# Patient Record
Sex: MALE | Race: BLACK OR AFRICAN AMERICAN | NOT HISPANIC OR LATINO | Employment: FULL TIME | URBAN - METROPOLITAN AREA
[De-identification: names, ages, dates, MRNs, and addresses within clinical notes are randomized per-mention and may not be internally consistent; named-entity substitution may affect disease eponyms.]

---

## 2023-03-01 LAB
ALANINE AMINOTRANSFERASE (SGPT) (U/L) IN SER/PLAS: 33 U/L (ref 10–52)
ALBUMIN (G/DL) IN SER/PLAS: 4.8 G/DL (ref 3.4–5)
ALKALINE PHOSPHATASE (U/L) IN SER/PLAS: 82 U/L (ref 33–120)
ANION GAP IN SER/PLAS: 12 MMOL/L (ref 10–20)
ASPARTATE AMINOTRANSFERASE (SGOT) (U/L) IN SER/PLAS: 24 U/L (ref 9–39)
BASOPHILS (10*3/UL) IN BLOOD BY AUTOMATED COUNT: 0.04 X10E9/L (ref 0–0.1)
BASOPHILS/100 LEUKOCYTES IN BLOOD BY AUTOMATED COUNT: 0.5 % (ref 0–2)
BILIRUBIN DIRECT (MG/DL) IN SER/PLAS: 0.1 MG/DL (ref 0–0.3)
BILIRUBIN TOTAL (MG/DL) IN SER/PLAS: 0.7 MG/DL (ref 0–1.2)
CALCIDIOL (25 OH VITAMIN D3) (NG/ML) IN SER/PLAS: 16 NG/ML
CALCIUM (MG/DL) IN SER/PLAS: 10.4 MG/DL (ref 8.6–10.6)
CARBON DIOXIDE, TOTAL (MMOL/L) IN SER/PLAS: 31 MMOL/L (ref 21–32)
CHLORIDE (MMOL/L) IN SER/PLAS: 101 MMOL/L (ref 98–107)
CHOLESTEROL (MG/DL) IN SER/PLAS: 241 MG/DL (ref 0–199)
CHOLESTEROL IN HDL (MG/DL) IN SER/PLAS: 56 MG/DL
CHOLESTEROL/HDL RATIO: 4.3
CREATININE (MG/DL) IN SER/PLAS: 1.11 MG/DL (ref 0.5–1.3)
EOSINOPHILS (10*3/UL) IN BLOOD BY AUTOMATED COUNT: 0.16 X10E9/L (ref 0–0.7)
EOSINOPHILS/100 LEUKOCYTES IN BLOOD BY AUTOMATED COUNT: 1.9 % (ref 0–6)
ERYTHROCYTE DISTRIBUTION WIDTH (RATIO) BY AUTOMATED COUNT: 12.1 % (ref 11.5–14.5)
ERYTHROCYTE MEAN CORPUSCULAR HEMOGLOBIN CONCENTRATION (G/DL) BY AUTOMATED: 33.1 G/DL (ref 32–36)
ERYTHROCYTE MEAN CORPUSCULAR VOLUME (FL) BY AUTOMATED COUNT: 93 FL (ref 80–100)
ERYTHROCYTES (10*6/UL) IN BLOOD BY AUTOMATED COUNT: 5.14 X10E12/L (ref 4.5–5.9)
GFR MALE: 88 ML/MIN/1.73M2
GLUCOSE (MG/DL) IN SER/PLAS: 83 MG/DL (ref 74–99)
HEMATOCRIT (%) IN BLOOD BY AUTOMATED COUNT: 48 % (ref 41–52)
HEMOGLOBIN (G/DL) IN BLOOD: 15.9 G/DL (ref 13.5–17.5)
IMMATURE GRANULOCYTES/100 LEUKOCYTES IN BLOOD BY AUTOMATED COUNT: 0.2 % (ref 0–0.9)
LDL: 157 MG/DL (ref 0–99)
LEUKOCYTES (10*3/UL) IN BLOOD BY AUTOMATED COUNT: 8.3 X10E9/L (ref 4.4–11.3)
LYMPHOCYTES (10*3/UL) IN BLOOD BY AUTOMATED COUNT: 2.77 X10E9/L (ref 1.2–4.8)
LYMPHOCYTES/100 LEUKOCYTES IN BLOOD BY AUTOMATED COUNT: 33.4 % (ref 13–44)
MONOCYTES (10*3/UL) IN BLOOD BY AUTOMATED COUNT: 1.03 X10E9/L (ref 0.1–1)
MONOCYTES/100 LEUKOCYTES IN BLOOD BY AUTOMATED COUNT: 12.4 % (ref 2–10)
NEUTROPHILS (10*3/UL) IN BLOOD BY AUTOMATED COUNT: 4.28 X10E9/L (ref 1.2–7.7)
NEUTROPHILS/100 LEUKOCYTES IN BLOOD BY AUTOMATED COUNT: 51.6 % (ref 40–80)
NRBC (PER 100 WBCS) BY AUTOMATED COUNT: 0 /100 WBC (ref 0–0)
PLATELETS (10*3/UL) IN BLOOD AUTOMATED COUNT: 298 X10E9/L (ref 150–450)
POTASSIUM (MMOL/L) IN SER/PLAS: 4.4 MMOL/L (ref 3.5–5.3)
PROTEIN TOTAL: 7.3 G/DL (ref 6.4–8.2)
SODIUM (MMOL/L) IN SER/PLAS: 140 MMOL/L (ref 136–145)
TRIGLYCERIDE (MG/DL) IN SER/PLAS: 139 MG/DL (ref 0–149)
UREA NITROGEN (MG/DL) IN SER/PLAS: 12 MG/DL (ref 6–23)
VLDL: 28 MG/DL (ref 0–40)

## 2023-03-02 LAB — THYROTROPIN (MIU/L) IN SER/PLAS BY DETECTION LIMIT <= 0.05 MIU/L: 2.46 MIU/L (ref 0.44–3.98)

## 2023-03-08 DIAGNOSIS — E03.9 HYPOTHYROIDISM, UNSPECIFIED TYPE: Primary | ICD-10-CM

## 2023-03-11 DIAGNOSIS — M62.830 MUSCLE SPASM OF BACK: ICD-10-CM

## 2023-03-14 DIAGNOSIS — K04.7 PERIAPICAL ABSCESS WITHOUT SINUS: ICD-10-CM

## 2023-03-15 RX ORDER — LEVOTHYROXINE SODIUM 25 UG/1
TABLET ORAL
Qty: 90 TABLET | Refills: 3 | Status: SHIPPED | OUTPATIENT
Start: 2023-03-15 | End: 2023-08-22 | Stop reason: ALTCHOICE

## 2023-03-15 RX ORDER — CYCLOBENZAPRINE HCL 10 MG
TABLET ORAL
Qty: 30 TABLET | Refills: 5 | Status: SHIPPED | OUTPATIENT
Start: 2023-03-15 | End: 2023-09-19

## 2023-03-27 RX ORDER — IBUPROFEN 800 MG/1
TABLET ORAL
Qty: 21 TABLET | OUTPATIENT
Start: 2023-03-27

## 2023-05-27 DIAGNOSIS — I10 ESSENTIAL (PRIMARY) HYPERTENSION: ICD-10-CM

## 2023-05-30 RX ORDER — AMLODIPINE BESYLATE 10 MG/1
TABLET ORAL
Qty: 90 TABLET | Refills: 0 | Status: SHIPPED | OUTPATIENT
Start: 2023-05-30 | End: 2023-08-22 | Stop reason: SDUPTHER

## 2023-08-21 PROBLEM — G47.00 INSOMNIA: Status: ACTIVE | Noted: 2023-08-21

## 2023-08-21 PROBLEM — U07.1 COVID-19 VIRUS INFECTION: Status: ACTIVE | Noted: 2023-08-21

## 2023-08-21 PROBLEM — F41.9 ANXIETY: Status: ACTIVE | Noted: 2023-08-21

## 2023-08-21 PROBLEM — E03.9 HYPOTHYROIDISM: Status: ACTIVE | Noted: 2023-08-21

## 2023-08-21 PROBLEM — K59.09 CHRONIC CONSTIPATION: Status: ACTIVE | Noted: 2023-08-21

## 2023-08-21 PROBLEM — R53.83 FATIGUE: Status: ACTIVE | Noted: 2023-08-21

## 2023-08-21 PROBLEM — R19.7 INTERMITTENT DIARRHEA: Status: ACTIVE | Noted: 2023-08-21

## 2023-08-21 PROBLEM — R93.2 ABNORMAL LIVER SCAN: Status: ACTIVE | Noted: 2023-08-21

## 2023-08-21 PROBLEM — E55.9 VITAMIN D DEFICIENCY: Status: ACTIVE | Noted: 2023-08-21

## 2023-08-21 PROBLEM — J30.9 ALLERGIC RHINITIS: Status: ACTIVE | Noted: 2023-08-21

## 2023-08-21 PROBLEM — J30.2 SEASONAL ALLERGIES: Status: ACTIVE | Noted: 2023-08-21

## 2023-08-21 PROBLEM — M62.830 BACK SPASM: Status: ACTIVE | Noted: 2023-08-21

## 2023-08-21 PROBLEM — I10 HYPERTENSION: Status: ACTIVE | Noted: 2023-08-21

## 2023-08-21 PROBLEM — I77.1 TORTUOUS AORTA (CMS-HCC): Status: ACTIVE | Noted: 2023-08-21

## 2023-08-21 PROBLEM — L30.9 ECZEMA: Status: ACTIVE | Noted: 2023-08-21

## 2023-08-21 PROBLEM — L01.02 PUSTULAR FOLLICULITIS: Status: ACTIVE | Noted: 2023-08-21

## 2023-08-21 RX ORDER — AMOXICILLIN AND CLAVULANATE POTASSIUM 875; 125 MG/1; MG/1
1 TABLET, FILM COATED ORAL 2 TIMES DAILY
COMMUNITY
Start: 2023-02-06 | End: 2023-08-22 | Stop reason: ALTCHOICE

## 2023-08-21 RX ORDER — MUPIROCIN 20 MG/G
OINTMENT TOPICAL 3 TIMES DAILY
COMMUNITY
Start: 2022-08-02 | End: 2024-01-16 | Stop reason: ALTCHOICE

## 2023-08-21 RX ORDER — ESCITALOPRAM OXALATE 20 MG/1
TABLET ORAL
COMMUNITY
Start: 2023-05-27 | End: 2023-08-22 | Stop reason: ALTCHOICE

## 2023-08-21 RX ORDER — PENICILLIN V POTASSIUM 500 MG/1
TABLET, FILM COATED ORAL
COMMUNITY
Start: 2023-05-27 | End: 2023-08-22 | Stop reason: ALTCHOICE

## 2023-08-21 RX ORDER — MONTELUKAST SODIUM 10 MG/1
10 TABLET ORAL DAILY
COMMUNITY
End: 2023-08-22 | Stop reason: ALTCHOICE

## 2023-08-21 RX ORDER — IBUPROFEN 800 MG/1
800 TABLET ORAL 3 TIMES DAILY PRN
COMMUNITY
End: 2024-01-16 | Stop reason: ALTCHOICE

## 2023-08-22 ENCOUNTER — OFFICE VISIT (OUTPATIENT)
Dept: PRIMARY CARE | Facility: CLINIC | Age: 38
End: 2023-08-22
Payer: COMMERCIAL

## 2023-08-22 VITALS
BODY MASS INDEX: 36.37 KG/M2 | OXYGEN SATURATION: 97 % | DIASTOLIC BLOOD PRESSURE: 73 MMHG | RESPIRATION RATE: 18 BRPM | WEIGHT: 240 LBS | HEART RATE: 82 BPM | HEIGHT: 68 IN | SYSTOLIC BLOOD PRESSURE: 119 MMHG | TEMPERATURE: 98.6 F

## 2023-08-22 DIAGNOSIS — G47.00 INSOMNIA, UNSPECIFIED TYPE: ICD-10-CM

## 2023-08-22 DIAGNOSIS — M62.830 BACK SPASM: ICD-10-CM

## 2023-08-22 DIAGNOSIS — I10 ESSENTIAL (PRIMARY) HYPERTENSION: ICD-10-CM

## 2023-08-22 DIAGNOSIS — R53.83 FATIGUE, UNSPECIFIED TYPE: ICD-10-CM

## 2023-08-22 DIAGNOSIS — J30.1 NON-SEASONAL ALLERGIC RHINITIS DUE TO POLLEN: ICD-10-CM

## 2023-08-22 DIAGNOSIS — E55.9 VITAMIN D DEFICIENCY: ICD-10-CM

## 2023-08-22 DIAGNOSIS — I10 HYPERTENSION, UNSPECIFIED TYPE: Primary | ICD-10-CM

## 2023-08-22 DIAGNOSIS — K76.0 FATTY LIVER: ICD-10-CM

## 2023-08-22 DIAGNOSIS — J30.2 SEASONAL ALLERGIES: ICD-10-CM

## 2023-08-22 DIAGNOSIS — F41.9 ANXIETY: ICD-10-CM

## 2023-08-22 DIAGNOSIS — K58.2 IRRITABLE BOWEL SYNDROME WITH BOTH CONSTIPATION AND DIARRHEA: ICD-10-CM

## 2023-08-22 DIAGNOSIS — F41.9 ANXIETY: Primary | ICD-10-CM

## 2023-08-22 PROBLEM — R93.2 ABNORMAL LIVER SCAN: Status: RESOLVED | Noted: 2023-08-21 | Resolved: 2023-08-22

## 2023-08-22 PROBLEM — U07.1 COVID-19 VIRUS INFECTION: Status: RESOLVED | Noted: 2023-08-21 | Resolved: 2023-08-22

## 2023-08-22 PROBLEM — R19.7 INTERMITTENT DIARRHEA: Status: RESOLVED | Noted: 2023-08-21 | Resolved: 2023-08-22

## 2023-08-22 PROBLEM — E03.9 HYPOTHYROIDISM: Status: RESOLVED | Noted: 2023-08-21 | Resolved: 2023-08-22

## 2023-08-22 PROBLEM — K59.09 CHRONIC CONSTIPATION: Status: RESOLVED | Noted: 2023-08-21 | Resolved: 2023-08-22

## 2023-08-22 PROCEDURE — 1036F TOBACCO NON-USER: CPT | Performed by: PEDIATRICS

## 2023-08-22 PROCEDURE — 99214 OFFICE O/P EST MOD 30 MIN: CPT | Performed by: PEDIATRICS

## 2023-08-22 PROCEDURE — 3078F DIAST BP <80 MM HG: CPT | Performed by: PEDIATRICS

## 2023-08-22 PROCEDURE — 3074F SYST BP LT 130 MM HG: CPT | Performed by: PEDIATRICS

## 2023-08-22 RX ORDER — ESCITALOPRAM OXALATE 5 MG/1
TABLET ORAL
Qty: 30 TABLET | Refills: 1 | Status: SHIPPED | OUTPATIENT
Start: 2023-08-22 | End: 2023-09-15

## 2023-08-22 RX ORDER — AMLODIPINE BESYLATE 10 MG/1
10 TABLET ORAL DAILY
Qty: 90 TABLET | Refills: 0 | Status: SHIPPED | OUTPATIENT
Start: 2023-08-22 | End: 2023-10-17 | Stop reason: SDUPTHER

## 2023-08-22 RX ORDER — BUSPIRONE HYDROCHLORIDE 15 MG/1
TABLET ORAL
Qty: 30 TABLET | Refills: 3 | Status: SHIPPED | OUTPATIENT
Start: 2023-08-22 | End: 2023-09-15

## 2023-08-22 RX ORDER — ESCITALOPRAM OXALATE 20 MG/1
20 TABLET ORAL DAILY
Qty: 30 TABLET | Refills: 5 | Status: SHIPPED | OUTPATIENT
Start: 2023-08-22 | End: 2023-08-22 | Stop reason: SINTOL

## 2023-08-22 ASSESSMENT — PAIN SCALES - GENERAL: PAINLEVEL: 0-NO PAIN

## 2023-08-22 NOTE — PATIENT INSTRUCTIONS
Tylenol PM at night  Ease up Lexapro to day; cut in half so you are taking 10 mg for a month; then 5 mg for a month and then 5 mg every other day for 2 weeks.  Start Buspirone at half tab twice a day for first week and then one twice a day  Avoid creamy dressings and chips  See counselor  Take vitamin D 1000 units a day  Read Anxiety Cure by dr Graciela Rodriguez  Follow up in 2 months

## 2023-08-22 NOTE — PROGRESS NOTES
"Subjective   Patient ID: Eric Saha is a 38 y.o. male who presents for Hypothyroidism and Hypertension.    HPI   Patient wants to get off of Lexapro because it did not work; feels vertigo when he does not take the Lexapro; explained that he has to taper off;  Bothered by insomnia  Eats salads mostly but with creamy dressing; may eat pulled pork or chicken ric;  Eats chips; not too much dessert  Has not taken Levothyroxine; yet last TSH was good; does not do cheese;  Has been drinking caffeine  Review of Systems    Objective   /73 (BP Location: Right arm, Patient Position: Sitting, BP Cuff Size: Large adult)   Pulse 82   Temp 37 °C (98.6 °F) (Temporal)   Resp 18   Ht 1.727 m (5' 8\")   Wt 109 kg (240 lb)   SpO2 97%   BMI 36.49 kg/m²     Physical Exam  HEENT--ear wax  Lungs clear  Heart reg  Abd soft and nontender  Low back nontender  Assessment/Plan   Problem List Items Addressed This Visit       Allergic rhinitis    Anxiety     Esitalopram not working; will try buspar and taper off lexapro         Relevant Medications    escitalopram (Lexapro) 20 mg tablet    busPIRone (Buspar) 15 mg tablet    Back spasm     Comes and goes; flexeril helps;          Fatigue    Hypertension - Primary     Did not take his meds today but BP excellent; will try off Lisinopril         Insomnia     Will try Tylenol PM         Seasonal allergies    Vitamin D deficiency    Irritable bowel syndrome with both constipation and diarrhea    Fatty liver    Relevant Orders    Fibroscan (Liver Elastography)     Other Visit Diagnoses       Essential (primary) hypertension                   "

## 2023-08-22 NOTE — PROGRESS NOTES
"Subjective   Patient ID: Eric Saha is a 38 y.o. male who presents for Hypothyroidism and Hypertension.    HPI   Insomnia gets around 4 hours of sleep per night  HTN- controlled amlodipine and lisinopril   Cholesterol high 3/2023  Vitamin D low 3/2023  Has not taken levothyroxine  Review of Systems    Objective   /73 (BP Location: Right arm, Patient Position: Sitting, BP Cuff Size: Large adult)   Pulse 82   Temp 37 °C (98.6 °F) (Temporal)   Resp 18   Ht 1.727 m (5' 8\")   Wt 109 kg (240 lb)   SpO2 97%   BMI 36.49 kg/m²     Physical Exam    Assessment/Plan          "

## 2023-09-14 DIAGNOSIS — F41.9 ANXIETY: ICD-10-CM

## 2023-09-15 RX ORDER — BUSPIRONE HYDROCHLORIDE 15 MG/1
TABLET ORAL
Qty: 30 TABLET | Refills: 3 | Status: SHIPPED | OUTPATIENT
Start: 2023-09-15 | End: 2023-09-26

## 2023-09-15 RX ORDER — ESCITALOPRAM OXALATE 5 MG/1
TABLET ORAL
Qty: 30 TABLET | Refills: 1 | Status: SHIPPED | OUTPATIENT
Start: 2023-09-15 | End: 2023-10-09

## 2023-09-16 DIAGNOSIS — M62.830 MUSCLE SPASM OF BACK: ICD-10-CM

## 2023-09-19 RX ORDER — CYCLOBENZAPRINE HCL 10 MG
TABLET ORAL
Qty: 30 TABLET | Refills: 0 | Status: SHIPPED | OUTPATIENT
Start: 2023-09-19 | End: 2024-01-16 | Stop reason: SDUPTHER

## 2023-09-23 DIAGNOSIS — F41.9 ANXIETY: ICD-10-CM

## 2023-09-26 RX ORDER — BUSPIRONE HYDROCHLORIDE 15 MG/1
TABLET ORAL
Qty: 180 TABLET | Refills: 0 | Status: SHIPPED | OUTPATIENT
Start: 2023-09-26 | End: 2023-10-17 | Stop reason: ALTCHOICE

## 2023-10-08 DIAGNOSIS — F41.9 ANXIETY: ICD-10-CM

## 2023-10-09 RX ORDER — ESCITALOPRAM OXALATE 5 MG/1
TABLET ORAL
Qty: 90 TABLET | Refills: 0 | Status: SHIPPED | OUTPATIENT
Start: 2023-10-09 | End: 2023-10-17 | Stop reason: ALTCHOICE

## 2023-10-17 ENCOUNTER — OFFICE VISIT (OUTPATIENT)
Dept: PRIMARY CARE | Facility: CLINIC | Age: 38
End: 2023-10-17
Payer: COMMERCIAL

## 2023-10-17 VITALS
SYSTOLIC BLOOD PRESSURE: 127 MMHG | TEMPERATURE: 97.7 F | WEIGHT: 244 LBS | BODY MASS INDEX: 37.1 KG/M2 | DIASTOLIC BLOOD PRESSURE: 77 MMHG | HEART RATE: 80 BPM

## 2023-10-17 DIAGNOSIS — K76.0 FATTY LIVER: Primary | ICD-10-CM

## 2023-10-17 DIAGNOSIS — F41.9 ANXIETY: ICD-10-CM

## 2023-10-17 DIAGNOSIS — I10 HYPERTENSION, UNSPECIFIED TYPE: ICD-10-CM

## 2023-10-17 DIAGNOSIS — I10 ESSENTIAL (PRIMARY) HYPERTENSION: ICD-10-CM

## 2023-10-17 PROCEDURE — 99213 OFFICE O/P EST LOW 20 MIN: CPT | Performed by: PEDIATRICS

## 2023-10-17 PROCEDURE — 1036F TOBACCO NON-USER: CPT | Performed by: PEDIATRICS

## 2023-10-17 PROCEDURE — 3078F DIAST BP <80 MM HG: CPT | Performed by: PEDIATRICS

## 2023-10-17 PROCEDURE — 3074F SYST BP LT 130 MM HG: CPT | Performed by: PEDIATRICS

## 2023-10-17 RX ORDER — AMLODIPINE BESYLATE 10 MG/1
10 TABLET ORAL DAILY
Qty: 90 TABLET | Refills: 0 | Status: SHIPPED | OUTPATIENT
Start: 2023-10-17 | End: 2024-03-29

## 2023-10-17 NOTE — PROGRESS NOTES
Subjective   Patient ID: Eric Saha is a 38 y.o. male who presents for HTN and anxiety    HPI   Patient feels anxious from time to time mostly from stress at work; feels like he is not sure of how he is doing at work although he does not get complaints from boss; Works 10 am to 6 pm. Sleeps from between 12 MN to 2 am until 9 am; sometimes he falls asleep sitting down after work; Every weekend is off; Does piece work at [employer]; He only gets evaluated every 6 months which is anxiety provoking for him because he is always wondering if he is doing well at work.  Review of Systems    Objective   /77   Pulse 80   Temp 36.5 °C (97.7 °F)   Wt 111 kg (244 lb)   BMI 37.10 kg/m²     Physical Exam  HEENT neg  Lungs clear  Heart RRR  Abd soft  No edema  Assessment/Plan   Problem List Items Addressed This Visit             ICD-10-CM    Anxiety F41.9    Hypertension I10    Fatty liver - Primary K76.0    Relevant Orders    Fibroscan (Liver Elastography)     Other Visit Diagnoses         Codes    Essential (primary) hypertension     I10    Relevant Medications    amLODIPine (Norvasc) 10 mg tablet

## 2023-10-17 NOTE — LETTER
October 17, 2023     Patient: Eric Saha   YOB: 1985   Date of Visit: 10/17/2023       To Whom It May Concern:    Eric Saha was seen in my clinic on 10/17/2023 at 3:15 pm. Please excuse Eric for his absence from work on this day to make the appointment. Please provide Eric with bimonthly feedback on his performance at work in order to prevent work-related anxiety.   If you have any questions or concerns, please don't hesitate to call.         Sincerely,         Ayleen Zavala MD        CC: No Recipients

## 2023-10-17 NOTE — PATIENT INSTRUCTIONS
Please ask for regular feedback form work;  Please walk half hour a day; get 10,000 steps a day;  Eat healthy; try for more home cooked foods  Taper off escitalopram after one month of every other day.   Taper off buspirone by taking half twice a day for a week and then once a day for a week.  Get me blood work from work. Fax to   Schedule the home sleep study.   Return in 3 months

## 2023-10-21 PROBLEM — L01.02 PUSTULAR FOLLICULITIS: Status: RESOLVED | Noted: 2023-08-21 | Resolved: 2023-10-21

## 2024-01-16 ENCOUNTER — OFFICE VISIT (OUTPATIENT)
Dept: PRIMARY CARE | Facility: CLINIC | Age: 39
End: 2024-01-16
Payer: COMMERCIAL

## 2024-01-16 VITALS
HEART RATE: 86 BPM | TEMPERATURE: 97.8 F | SYSTOLIC BLOOD PRESSURE: 126 MMHG | WEIGHT: 242 LBS | BODY MASS INDEX: 36.8 KG/M2 | DIASTOLIC BLOOD PRESSURE: 81 MMHG

## 2024-01-16 DIAGNOSIS — Z00.00 WELL ADULT EXAM: Primary | ICD-10-CM

## 2024-01-16 DIAGNOSIS — I10 ESSENTIAL (PRIMARY) HYPERTENSION: ICD-10-CM

## 2024-01-16 DIAGNOSIS — K59.04 CHRONIC IDIOPATHIC CONSTIPATION: ICD-10-CM

## 2024-01-16 DIAGNOSIS — M62.830 BACK SPASM: ICD-10-CM

## 2024-01-16 DIAGNOSIS — M77.51 TENDONITIS OF ANKLE, RIGHT: ICD-10-CM

## 2024-01-16 DIAGNOSIS — G47.00 INSOMNIA, UNSPECIFIED TYPE: ICD-10-CM

## 2024-01-16 DIAGNOSIS — R53.83 FATIGUE, UNSPECIFIED TYPE: ICD-10-CM

## 2024-01-16 DIAGNOSIS — M62.830 MUSCLE SPASM OF BACK: ICD-10-CM

## 2024-01-16 DIAGNOSIS — E66.01 CLASS 2 SEVERE OBESITY DUE TO EXCESS CALORIES WITH SERIOUS COMORBIDITY AND BODY MASS INDEX (BMI) OF 36.0 TO 36.9 IN ADULT (MULTI): ICD-10-CM

## 2024-01-16 DIAGNOSIS — E55.9 VITAMIN D DEFICIENCY: ICD-10-CM

## 2024-01-16 DIAGNOSIS — K76.0 FATTY LIVER: ICD-10-CM

## 2024-01-16 DIAGNOSIS — K58.2 IRRITABLE BOWEL SYNDROME WITH BOTH CONSTIPATION AND DIARRHEA: ICD-10-CM

## 2024-01-16 PROBLEM — E66.812 CLASS 2 SEVERE OBESITY DUE TO EXCESS CALORIES WITH SERIOUS COMORBIDITY AND BODY MASS INDEX (BMI) OF 36.0 TO 36.9 IN ADULT: Status: ACTIVE | Noted: 2024-01-16

## 2024-01-16 PROBLEM — K59.00 CONSTIPATION: Status: ACTIVE | Noted: 2023-08-21

## 2024-01-16 PROCEDURE — 3074F SYST BP LT 130 MM HG: CPT | Performed by: PEDIATRICS

## 2024-01-16 PROCEDURE — 99395 PREV VISIT EST AGE 18-39: CPT | Performed by: PEDIATRICS

## 2024-01-16 PROCEDURE — 3008F BODY MASS INDEX DOCD: CPT | Performed by: PEDIATRICS

## 2024-01-16 PROCEDURE — 1036F TOBACCO NON-USER: CPT | Performed by: PEDIATRICS

## 2024-01-16 PROCEDURE — 3079F DIAST BP 80-89 MM HG: CPT | Performed by: PEDIATRICS

## 2024-01-16 RX ORDER — CYCLOBENZAPRINE HCL 10 MG
10 TABLET ORAL NIGHTLY
Qty: 90 TABLET | Refills: 1 | Status: SHIPPED | OUTPATIENT
Start: 2024-01-16

## 2024-01-16 RX ORDER — VIT C/E/ZN/COPPR/LUTEIN/ZEAXAN 250MG-90MG
25 CAPSULE ORAL DAILY
Qty: 30 CAPSULE | Refills: 11 | Status: SHIPPED | OUTPATIENT
Start: 2024-01-16 | End: 2025-01-15

## 2024-01-16 NOTE — PATIENT INSTRUCTIONS
See foot doctor   Avoid chips; eat fruit and veggies;   Avoid pop; one glass of juice a day  10,000 steps a day  Return in 6 months  
oscillates between living with his mother and his grandmother

## 2024-01-16 NOTE — PROGRESS NOTES
Subjective   Patient ID: Eric Saha is a 38 y.o. male who presents for annual check up    HPI     Patient had pharyngitis after [place] which was treated with Amoxicillin for presumed strep at urgent care;   Also he developed acute pain lateral right ankle 2 weeks ago; repetitively was using foot pedal at work that day;  [place] [address] [facility] thought it was a tendonitis; they recommended an xray which was negative except for an old fracture. Now he is not in pain but feels ankle is not as strong as it should be. Since 2 weeks ago, he has not done the pedal work.   Eats out one day a week; rest of the time he has been eating healthy. Eats salad for lunch; breakfast--cereal; dinner--processed food available at [employer];   Snacks on vending machine food-->chips; has cut out sweets  Review of Systems  Anxiety is better than before;   Objective   /81   Pulse 86   Temp 36.6 °C (97.8 °F)   Wt 110 kg (242 lb)   BMI 36.80 kg/m²     Physical Exam  Lungs clear  Heart RRR  Abd soft  No edema  Ankle exam normal  Assessment/Plan   Problem List Items Addressed This Visit             ICD-10-CM    Back spasm M62.830    Constipation K59.00    Relevant Medications    linaCLOtide (Linzess) 145 mcg capsule    Fatigue R53.83     Better than before; able to stay until bedtime         Insomnia G47.00     Some days sleeps well         Vitamin D deficiency E55.9    Relevant Medications    cholecalciferol (Vitamin D-3) 25 MCG (1000 UT) capsule    RESOLVED: Irritable bowel syndrome with both constipation and diarrhea K58.2    Fatty liver K76.0    Relevant Orders    Fibroscan (Liver Elastography) GI    Hepatitis panel, acute    Tendonitis of ankle, right M77.51    Relevant Orders    Referral to Physical Therapy    Class 2 severe obesity due to excess calories with serious comorbidity and body mass index (BMI) of 36.0 to 36.9 in adult (CMS/HCC) E66.01, Z68.36    Relevant Orders    Hemoglobin A1C    Comprehensive Metabolic Panel     Lipid Panel     Other Visit Diagnoses         Codes    Well adult exam    -  Primary Z00.00    Muscle spasm of back     M62.830    Relevant Medications    cyclobenzaprine (Flexeril) 10 mg tablet    Essential (primary) hypertension     I10

## 2024-03-29 DIAGNOSIS — I10 ESSENTIAL (PRIMARY) HYPERTENSION: ICD-10-CM

## 2024-03-29 RX ORDER — AMLODIPINE BESYLATE 10 MG/1
10 TABLET ORAL DAILY
Qty: 90 TABLET | Refills: 0 | Status: SHIPPED | OUTPATIENT
Start: 2024-03-29

## 2024-06-27 DIAGNOSIS — I10 ESSENTIAL (PRIMARY) HYPERTENSION: ICD-10-CM

## 2024-06-28 RX ORDER — AMLODIPINE BESYLATE 10 MG/1
10 TABLET ORAL DAILY
Qty: 90 TABLET | Refills: 0 | Status: SHIPPED | OUTPATIENT
Start: 2024-06-28

## 2024-07-14 DIAGNOSIS — M62.830 MUSCLE SPASM OF BACK: ICD-10-CM

## 2024-07-16 ENCOUNTER — APPOINTMENT (OUTPATIENT)
Dept: PRIMARY CARE | Facility: CLINIC | Age: 39
End: 2024-07-16
Payer: COMMERCIAL

## 2024-07-16 VITALS
TEMPERATURE: 98 F | SYSTOLIC BLOOD PRESSURE: 127 MMHG | WEIGHT: 244.8 LBS | OXYGEN SATURATION: 94 % | BODY MASS INDEX: 37.1 KG/M2 | HEIGHT: 68 IN | HEART RATE: 83 BPM | DIASTOLIC BLOOD PRESSURE: 85 MMHG

## 2024-07-16 DIAGNOSIS — K76.0 FATTY LIVER: ICD-10-CM

## 2024-07-16 DIAGNOSIS — G47.00 INSOMNIA, UNSPECIFIED TYPE: ICD-10-CM

## 2024-07-16 DIAGNOSIS — I10 HYPERTENSION, UNSPECIFIED TYPE: Primary | ICD-10-CM

## 2024-07-16 DIAGNOSIS — E66.01 CLASS 2 SEVERE OBESITY DUE TO EXCESS CALORIES WITH SERIOUS COMORBIDITY AND BODY MASS INDEX (BMI) OF 36.0 TO 36.9 IN ADULT (MULTI): ICD-10-CM

## 2024-07-16 DIAGNOSIS — M62.830 BACK SPASM: ICD-10-CM

## 2024-07-16 DIAGNOSIS — E66.01 CLASS 2 SEVERE OBESITY DUE TO EXCESS CALORIES WITH SERIOUS COMORBIDITY AND BODY MASS INDEX (BMI) OF 37.0 TO 37.9 IN ADULT (MULTI): ICD-10-CM

## 2024-07-16 DIAGNOSIS — J30.1 NON-SEASONAL ALLERGIC RHINITIS DUE TO POLLEN: ICD-10-CM

## 2024-07-16 DIAGNOSIS — K59.04 CHRONIC IDIOPATHIC CONSTIPATION: ICD-10-CM

## 2024-07-16 DIAGNOSIS — M77.51 TENDONITIS OF ANKLE, RIGHT: ICD-10-CM

## 2024-07-16 DIAGNOSIS — R53.83 FATIGUE, UNSPECIFIED TYPE: ICD-10-CM

## 2024-07-16 DIAGNOSIS — E55.9 VITAMIN D DEFICIENCY: ICD-10-CM

## 2024-07-16 PROBLEM — E66.812 CLASS 2 OBESITY DUE TO EXCESS CALORIES WITH BODY MASS INDEX (BMI) OF 37.0 TO 37.9 IN ADULT: Status: ACTIVE | Noted: 2024-07-16

## 2024-07-16 PROBLEM — E66.09 CLASS 2 OBESITY DUE TO EXCESS CALORIES WITH BODY MASS INDEX (BMI) OF 37.0 TO 37.9 IN ADULT: Status: ACTIVE | Noted: 2024-07-16

## 2024-07-16 PROBLEM — E66.812 CLASS 2 OBESITY DUE TO EXCESS CALORIES WITH BODY MASS INDEX (BMI) OF 37.0 TO 37.9 IN ADULT: Status: RESOLVED | Noted: 2024-07-16 | Resolved: 2024-07-16

## 2024-07-16 PROBLEM — F41.9 ANXIETY: Status: RESOLVED | Noted: 2023-08-21 | Resolved: 2024-07-16

## 2024-07-16 PROBLEM — E66.09 CLASS 2 OBESITY DUE TO EXCESS CALORIES WITH BODY MASS INDEX (BMI) OF 37.0 TO 37.9 IN ADULT: Status: RESOLVED | Noted: 2024-07-16 | Resolved: 2024-07-16

## 2024-07-16 PROCEDURE — 99214 OFFICE O/P EST MOD 30 MIN: CPT | Performed by: PEDIATRICS

## 2024-07-16 PROCEDURE — 84443 ASSAY THYROID STIM HORMONE: CPT | Performed by: PEDIATRICS

## 2024-07-16 PROCEDURE — 80053 COMPREHEN METABOLIC PANEL: CPT | Performed by: PEDIATRICS

## 2024-07-16 PROCEDURE — 85025 COMPLETE CBC W/AUTO DIFF WBC: CPT | Performed by: PEDIATRICS

## 2024-07-16 PROCEDURE — 83036 HEMOGLOBIN GLYCOSYLATED A1C: CPT | Performed by: PEDIATRICS

## 2024-07-16 PROCEDURE — 80061 LIPID PANEL: CPT | Performed by: PEDIATRICS

## 2024-07-16 PROCEDURE — 82306 VITAMIN D 25 HYDROXY: CPT | Performed by: PEDIATRICS

## 2024-07-16 PROCEDURE — 3008F BODY MASS INDEX DOCD: CPT | Performed by: PEDIATRICS

## 2024-07-16 PROCEDURE — 3079F DIAST BP 80-89 MM HG: CPT | Performed by: PEDIATRICS

## 2024-07-16 PROCEDURE — 3074F SYST BP LT 130 MM HG: CPT | Performed by: PEDIATRICS

## 2024-07-16 RX ORDER — CYCLOBENZAPRINE HCL 10 MG
10 TABLET ORAL NIGHTLY
Qty: 90 TABLET | Refills: 1 | Status: SHIPPED | OUTPATIENT
Start: 2024-07-16

## 2024-07-16 ASSESSMENT — ENCOUNTER SYMPTOMS
DIZZINESS: 0
CHILLS: 0
SHORTNESS OF BREATH: 0
HEADACHES: 0
ARTHRALGIAS: 1
FEVER: 0

## 2024-07-16 NOTE — PROGRESS NOTES
"Subjective   Patient ID: Eric Saha is a 39 y.o. male who presents for HTN    HPI     Here for a 6 month follow up.  No questions or concerns today.  Would like to have physical form filled out for work ([employer])    Went to podiatry for right ankle/foot pain, states he changed his shoes and no longer has concerns.      Diet: fruits, vegetables, snacks from vending machine    Sleeps: 4-5 hours of sleep - wakes up fatigued   Exercise: at work     Fibroscan - has not done yet   LDL: 157, cholesterol 241 - last year   Has not been able to schedule home sleep study     No smoking or drinking  Review of Systems   Constitutional:  Negative for chills and fever.   Respiratory:  Negative for shortness of breath.    Cardiovascular:  Negative for chest pain.   Musculoskeletal:  Positive for arthralgias.   Neurological:  Negative for dizziness and headaches.       Objective   /85   Pulse 83   Temp 36.7 °C (98 °F)   Ht 1.727 m (5' 7.99\")   Wt 111 kg (244 lb 12.8 oz)   SpO2 94%   BMI 37.23 kg/m²     Physical Exam  Vitals reviewed.   Constitutional:       General: He is not in acute distress.  HENT:      Head: Normocephalic.      Right Ear: Tympanic membrane normal.      Left Ear: Tympanic membrane normal.      Nose: Nose normal.      Mouth/Throat:      Pharynx: Oropharynx is clear.   Cardiovascular:      Rate and Rhythm: Normal rate and regular rhythm.      Heart sounds: Normal heart sounds.   Pulmonary:      Breath sounds: Normal breath sounds.   Abdominal:      Palpations: Abdomen is soft.      Tenderness: There is no abdominal tenderness.   Musculoskeletal:         General: No tenderness.   Skin:     Findings: No rash.   Neurological:      General: No focal deficit present.      Mental Status: He is alert.   Psychiatric:         Mood and Affect: Mood normal.         Assessment/Plan   Problem List Items Addressed This Visit             ICD-10-CM    Allergic rhinitis J30.9     Uses OTC once or twice when " needed          Relevant Orders    CBC    Back spasm M62.830     Improves w/ flexeril         Constipation K59.00     Improving         Fatigue R53.83     Needs to schedule home sleep study         Relevant Orders    Home sleep apnea test (HSAT)    Hypertension - Primary I10    Insomnia G47.00     Wants to try Tylenol PM first         Vitamin D deficiency E55.9     Taking vitamin D supplements         Relevant Orders    Vitamin D 25-Hydroxy,Total (for eval of Vitamin D levels)    Fatty liver K76.0     Needs to get fibroscan         Relevant Orders    Fibroscan (Liver Elastography) GI    Tendonitis of ankle, right M77.51     No concerns w/ change of shoes          Class 2 severe obesity due to excess calories with serious comorbidity and body mass index (BMI) of 36.0 to 36.9 in adult (Multi) E66.01, Z68.36    RESOLVED: Class 2 obesity due to excess calories with body mass index (BMI) of 37.0 to 37.9 in adult E66.09, Z68.37    Relevant Orders    Comprehensive Metabolic Panel    Hemoglobin A1C    Lipid Panel    Thyroid Stimulating Hormone

## 2024-08-01 ENCOUNTER — CLINICAL SUPPORT (OUTPATIENT)
Dept: GASTROENTEROLOGY | Facility: CLINIC | Age: 39
End: 2024-08-01
Payer: COMMERCIAL

## 2024-08-01 DIAGNOSIS — K76.0 FATTY LIVER: ICD-10-CM

## 2024-08-01 PROCEDURE — 91200 LIVER ELASTOGRAPHY: CPT | Performed by: INTERNAL MEDICINE

## 2024-08-01 NOTE — LETTER
August 2, 2024     Ayleen Zavala MD  [address]  [address]  [facility] [address]    Patient: Eric Saha   YOB: 1985   Date of Visit: 8/1/2024       Dear Dr. Ayleen Zavala MD:    Thank you for referring Eric Saha to the [facility] for evaluation with FibroScan. Below are my notes for this consultation. If you have questions, please do not hesitate to call me.      Sincerely,     [id number] FIBROSCAN      CC: No Recipients  ______________________________________________________________________________________      Results:  E (median liver stiffness measurement):   5.2 kPa  CAP (controlled attenuation parameter):  272  dB/m    Interpretation:  This was a technically adequate study. 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 The CAP score is consistent with 5 - 33% hepatocyte steatosis (steatosis stage 1 of 3) .    Jostin Cleaning MD  Hepatology

## 2024-08-02 NOTE — PROGRESS NOTES
Results:  E (median liver stiffness measurement):   5.2 kPa  CAP (controlled attenuation parameter):  272  dB/m    Interpretation:  This was a technically adequate study. 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 The CAP score is consistent with 5 - 33% hepatocyte steatosis (steatosis stage 1 of 3) .    Jostin Cleaning MD  Hepatology

## 2024-08-05 ENCOUNTER — TELEPHONE (OUTPATIENT)
Dept: PRIMARY CARE | Facility: CLINIC | Age: 39
End: 2024-08-05
Payer: COMMERCIAL

## 2024-08-05 NOTE — TELEPHONE ENCOUNTER
----- Message from Ayleen Zavala sent at 8/4/2024  4:00 PM EDT -----  Please mail him a low cholesterol diet.

## 2024-09-21 DIAGNOSIS — E78.5 HYPERLIPIDEMIA, UNSPECIFIED HYPERLIPIDEMIA TYPE: Primary | ICD-10-CM

## 2024-09-26 DIAGNOSIS — I10 ESSENTIAL (PRIMARY) HYPERTENSION: ICD-10-CM

## 2024-09-26 RX ORDER — AMLODIPINE BESYLATE 10 MG/1
10 TABLET ORAL DAILY
Qty: 90 TABLET | Refills: 0 | Status: SHIPPED | OUTPATIENT
Start: 2024-09-26

## 2024-10-05 DIAGNOSIS — E78.5 HYPERLIPIDEMIA, UNSPECIFIED HYPERLIPIDEMIA TYPE: Primary | ICD-10-CM

## 2024-11-10 DIAGNOSIS — E55.9 VITAMIN D DEFICIENCY: ICD-10-CM

## 2024-11-12 RX ORDER — CHOLECALCIFEROL (VITAMIN D3) 25 MCG
TABLET ORAL DAILY
Qty: 90 TABLET | Refills: 3 | Status: SHIPPED | OUTPATIENT
Start: 2024-11-12

## 2024-12-22 DIAGNOSIS — I10 ESSENTIAL (PRIMARY) HYPERTENSION: ICD-10-CM

## 2024-12-24 RX ORDER — AMLODIPINE BESYLATE 10 MG/1
10 TABLET ORAL DAILY
Qty: 90 TABLET | Refills: 0 | Status: SHIPPED | OUTPATIENT
Start: 2024-12-24

## 2024-12-31 DIAGNOSIS — M62.830 MUSCLE SPASM OF BACK: ICD-10-CM

## 2025-01-03 RX ORDER — CYCLOBENZAPRINE HCL 10 MG
10 TABLET ORAL NIGHTLY
Qty: 90 TABLET | Refills: 1 | Status: SHIPPED | OUTPATIENT
Start: 2025-01-03

## 2025-01-16 ENCOUNTER — APPOINTMENT (OUTPATIENT)
Dept: PRIMARY CARE | Facility: CLINIC | Age: 40
End: 2025-01-16
Payer: COMMERCIAL

## 2025-01-16 VITALS
SYSTOLIC BLOOD PRESSURE: 139 MMHG | WEIGHT: 254 LBS | DIASTOLIC BLOOD PRESSURE: 86 MMHG | BODY MASS INDEX: 38.63 KG/M2 | OXYGEN SATURATION: 98 % | TEMPERATURE: 97.7 F | HEART RATE: 101 BPM

## 2025-01-16 DIAGNOSIS — M77.51 TENDONITIS OF ANKLE, RIGHT: ICD-10-CM

## 2025-01-16 DIAGNOSIS — E78.5 HYPERLIPIDEMIA, UNSPECIFIED HYPERLIPIDEMIA TYPE: ICD-10-CM

## 2025-01-16 DIAGNOSIS — R53.83 FATIGUE, UNSPECIFIED TYPE: ICD-10-CM

## 2025-01-16 DIAGNOSIS — L21.9 SEBORRHEIC DERMATITIS: ICD-10-CM

## 2025-01-16 DIAGNOSIS — I10 HYPERTENSION, UNSPECIFIED TYPE: Primary | ICD-10-CM

## 2025-01-16 DIAGNOSIS — K76.0 FATTY LIVER: ICD-10-CM

## 2025-01-16 DIAGNOSIS — M62.830 BACK SPASM: ICD-10-CM

## 2025-01-16 PROBLEM — L30.9 ECZEMA: Status: RESOLVED | Noted: 2023-08-21 | Resolved: 2025-01-16

## 2025-01-16 PROBLEM — G47.00 INSOMNIA: Status: RESOLVED | Noted: 2023-08-21 | Resolved: 2025-01-16

## 2025-01-16 PROCEDURE — 3075F SYST BP GE 130 - 139MM HG: CPT | Performed by: PEDIATRICS

## 2025-01-16 PROCEDURE — 99213 OFFICE O/P EST LOW 20 MIN: CPT | Performed by: PEDIATRICS

## 2025-01-16 PROCEDURE — G2211 COMPLEX E/M VISIT ADD ON: HCPCS | Performed by: PEDIATRICS

## 2025-01-16 PROCEDURE — 3079F DIAST BP 80-89 MM HG: CPT | Performed by: PEDIATRICS

## 2025-01-16 PROCEDURE — 1036F TOBACCO NON-USER: CPT | Performed by: PEDIATRICS

## 2025-01-16 RX ORDER — KETOCONAZOLE 20 MG/ML
SHAMPOO, SUSPENSION TOPICAL 2 TIMES WEEKLY
Qty: 120 ML | Refills: 0 | Status: SHIPPED | OUTPATIENT
Start: 2025-01-16

## 2025-01-16 NOTE — PROGRESS NOTES
Subjective   Patient ID: Eric Saha is a 39 y.o. male who presents for HTN    HPI   Patient has been sleeping better since controlling his schedule.   Needs new shoes because this controlled his right foot and ankle pain in the past.   Eating out a lot;   Declines flu shot  Review of Systems  Bowels move ok; doing well on lactose free milk;   Complains of redness and scaling in beard area  Objective   /86   Pulse 101   Temp 36.5 °C (97.7 °F)   Wt 115 kg (254 lb)   SpO2 98%   BMI 38.63 kg/m²     Physical Exam  Vitals reviewed.   Constitutional:       General: He is not in acute distress.  HENT:      Head: Normocephalic.      Right Ear: Tympanic membrane normal.      Left Ear: Tympanic membrane normal.      Nose: Nose normal.      Mouth/Throat:      Pharynx: Oropharynx is clear.   Cardiovascular:      Rate and Rhythm: Normal rate and regular rhythm.      Heart sounds: Normal heart sounds.   Pulmonary:      Breath sounds: Normal breath sounds.   Abdominal:      Palpations: Abdomen is soft.      Tenderness: There is no abdominal tenderness.   Musculoskeletal:         General: No tenderness.   Skin:     Findings: No rash.   Neurological:      General: No focal deficit present.      Mental Status: He is alert.   Psychiatric:         Mood and Affect: Mood normal.         Assessment/Plan   Problem List Items Addressed This Visit             ICD-10-CM    Back spasm M62.830    RESOLVED: Fatigue R53.83    Hypertension - Primary I10    Fatty liver K76.0    Tendonitis of ankle, right M77.51    Hyperlipidemia E78.5    Seborrheic dermatitis L21.9    Relevant Medications    ketoconazole (NIZOral) 2 % shampoo

## 2025-01-16 NOTE — PATIENT INSTRUCTIONS
Walk half hour a day;  Home cooked meals;   Eat fruit for snacks;   Monitor blood pressure at home;  Follow up in 3 months

## 2025-03-12 DIAGNOSIS — L21.9 SEBORRHEIC DERMATITIS: ICD-10-CM

## 2025-03-13 RX ORDER — KETOCONAZOLE 20 MG/ML
SHAMPOO, SUSPENSION TOPICAL 2 TIMES WEEKLY
Qty: 120 ML | Refills: 0 | Status: SHIPPED | OUTPATIENT
Start: 2025-03-13

## 2025-03-14 ENCOUNTER — PATIENT OUTREACH (OUTPATIENT)
Dept: CARE COORDINATION | Facility: CLINIC | Age: 40
End: 2025-03-14
Payer: COMMERCIAL

## 2025-03-14 NOTE — PROGRESS NOTES
Left voicemail requested return call regarding referral from Dr. Zavala for MNT related to elvated lipids. Name and contact number provided for return call

## 2025-03-21 ENCOUNTER — PATIENT OUTREACH (OUTPATIENT)
Dept: CARE COORDINATION | Facility: CLINIC | Age: 40
End: 2025-03-21
Payer: COMMERCIAL

## 2025-03-21 ENCOUNTER — PATIENT MESSAGE (OUTPATIENT)
Dept: CARE COORDINATION | Facility: CLINIC | Age: 40
End: 2025-03-21
Payer: COMMERCIAL

## 2025-03-21 NOTE — PROGRESS NOTES
no return call from initial outreach; per policy will provide appropriate education material per referral to patient via mychart.  Referral closed at this time.

## 2025-03-27 DIAGNOSIS — I10 ESSENTIAL (PRIMARY) HYPERTENSION: ICD-10-CM

## 2025-03-28 RX ORDER — AMLODIPINE BESYLATE 10 MG/1
10 TABLET ORAL DAILY
Qty: 90 TABLET | Refills: 0 | Status: SHIPPED | OUTPATIENT
Start: 2025-03-28

## 2025-04-16 ENCOUNTER — APPOINTMENT (OUTPATIENT)
Dept: PRIMARY CARE | Facility: CLINIC | Age: 40
End: 2025-04-16
Payer: COMMERCIAL

## 2025-04-21 ENCOUNTER — APPOINTMENT (OUTPATIENT)
Dept: PRIMARY CARE | Facility: CLINIC | Age: 40
End: 2025-04-21
Payer: COMMERCIAL

## 2025-04-30 ENCOUNTER — APPOINTMENT (OUTPATIENT)
Dept: PRIMARY CARE | Facility: CLINIC | Age: 40
End: 2025-04-30
Payer: COMMERCIAL

## 2025-04-30 VITALS
TEMPERATURE: 97.8 F | SYSTOLIC BLOOD PRESSURE: 132 MMHG | HEART RATE: 94 BPM | OXYGEN SATURATION: 96 % | WEIGHT: 250 LBS | DIASTOLIC BLOOD PRESSURE: 85 MMHG | BODY MASS INDEX: 38.02 KG/M2

## 2025-04-30 DIAGNOSIS — M54.50 ACUTE BILATERAL LOW BACK PAIN WITHOUT SCIATICA: ICD-10-CM

## 2025-04-30 DIAGNOSIS — E78.5 HYPERLIPIDEMIA, UNSPECIFIED HYPERLIPIDEMIA TYPE: ICD-10-CM

## 2025-04-30 DIAGNOSIS — M79.672 PAIN OF BOTH HEELS: ICD-10-CM

## 2025-04-30 DIAGNOSIS — E66.812 CLASS 2 SEVERE OBESITY DUE TO EXCESS CALORIES WITH SERIOUS COMORBIDITY AND BODY MASS INDEX (BMI) OF 35.0 TO 35.9 IN ADULT: ICD-10-CM

## 2025-04-30 DIAGNOSIS — I10 HYPERTENSION, UNSPECIFIED TYPE: ICD-10-CM

## 2025-04-30 DIAGNOSIS — E55.9 VITAMIN D DEFICIENCY: ICD-10-CM

## 2025-04-30 DIAGNOSIS — E66.01 CLASS 2 SEVERE OBESITY DUE TO EXCESS CALORIES WITH SERIOUS COMORBIDITY AND BODY MASS INDEX (BMI) OF 35.0 TO 35.9 IN ADULT: ICD-10-CM

## 2025-04-30 DIAGNOSIS — K76.0 FATTY LIVER: ICD-10-CM

## 2025-04-30 DIAGNOSIS — M79.671 PAIN OF BOTH HEELS: ICD-10-CM

## 2025-04-30 DIAGNOSIS — K59.04 CHRONIC IDIOPATHIC CONSTIPATION: ICD-10-CM

## 2025-04-30 DIAGNOSIS — Z00.00 ANNUAL PHYSICAL EXAM: Primary | ICD-10-CM

## 2025-04-30 PROCEDURE — 99395 PREV VISIT EST AGE 18-39: CPT | Performed by: PEDIATRICS

## 2025-04-30 PROCEDURE — 3079F DIAST BP 80-89 MM HG: CPT | Performed by: PEDIATRICS

## 2025-04-30 PROCEDURE — 3075F SYST BP GE 130 - 139MM HG: CPT | Performed by: PEDIATRICS

## 2025-04-30 NOTE — ASSESSMENT & PLAN NOTE
Eats chips and pastries from vending machine;  Breakfast--cereal with 2% lactaid milk  Lunch--premade salad  Dinner--cooks for self; chicken, veggies,  Snacks at home  Does not exercise

## 2025-04-30 NOTE — PATIENT INSTRUCTIONS
Food addicts anonymous  Salad with NON-creamy dressing and low turkey sandwich for lunch; fruits for snacks  Half hour of exercise daily; (you lupillo Estevez); walk, bike, swim;   Heel pain-->toes to your nose 5 minutes 3 times a day  Heel cups  Return in  August fasting

## 2025-04-30 NOTE — PROGRESS NOTES
Subjective   Patient ID: Eric Saha is a 39 y.o. male who presents for annual    HPI   Patient states that last night he felt a strange sensation of pressure (no pain) from left side of neck up to his head that was momentary but repeated itself every 5  to 10 minutes for 4 or 5 episodes;   Does not have a neck pillow;   Has a My Pillow.  He checks BP infrequently  Live with step-Mom  Feels he does not have a purpose; not sure what he wants to do  Review of Systems  Low back pain last week  Objective   /85   Pulse 94   Temp 36.6 °C (97.8 °F)   Wt 113 kg (250 lb)   SpO2 96%   BMI 38.02 kg/m²     Physical Exam  Vitals reviewed.   Constitutional:       General: He is not in acute distress.  HENT:      Head: Normocephalic.      Right Ear: Tympanic membrane normal.      Left Ear: Tympanic membrane normal.      Nose: Nose normal.      Mouth/Throat:      Pharynx: Oropharynx is clear.   Cardiovascular:      Rate and Rhythm: Normal rate and regular rhythm.      Heart sounds: Normal heart sounds.   Pulmonary:      Breath sounds: Normal breath sounds.   Abdominal:      Palpations: Abdomen is soft.      Tenderness: There is no abdominal tenderness.   Musculoskeletal:         General: No tenderness.      Comments: SLR negative   Skin:     Findings: No rash.   Neurological:      General: No focal deficit present.      Mental Status: He is alert.   Psychiatric:         Mood and Affect: Mood normal.         Assessment/Plan   Problem List Items Addressed This Visit           ICD-10-CM    Constipation K59.00    Hypertension I10    Checks BP infrequently         Vitamin D deficiency E55.9    Fatty liver K76.0    Class 2 severe obesity due to excess calories with serious comorbidity and body mass index (BMI) of 35.0 to 35.9 in adult E66.812, E66.01, Z68.35    Eats chips and pastries from vending machine;  Breakfast--cereal with 2% lactaid milk  Lunch--premade salad  Dinner--cooks for self; chicken, veggies,  Snacks at  home  Does not exercise         Hyperlipidemia E78.5    Acute bilateral low back pain without sciatica M54.50    Pain of both heels M79.671, M79.672     Other Visit Diagnoses         Codes      Annual physical exam    -  Primary Z00.00

## 2025-09-02 ENCOUNTER — APPOINTMENT (OUTPATIENT)
Dept: PRIMARY CARE | Facility: CLINIC | Age: 40
End: 2025-09-02
Payer: COMMERCIAL

## 2025-09-11 ENCOUNTER — APPOINTMENT (OUTPATIENT)
Dept: PRIMARY CARE | Facility: CLINIC | Age: 40
End: 2025-09-11
Payer: COMMERCIAL